# Patient Record
Sex: FEMALE | Race: WHITE | Employment: FULL TIME | URBAN - METROPOLITAN AREA
[De-identification: names, ages, dates, MRNs, and addresses within clinical notes are randomized per-mention and may not be internally consistent; named-entity substitution may affect disease eponyms.]

---

## 2017-11-05 ENCOUNTER — APPOINTMENT (EMERGENCY)
Dept: RADIOLOGY | Facility: HOSPITAL | Age: 27
End: 2017-11-05
Payer: COMMERCIAL

## 2017-11-05 ENCOUNTER — HOSPITAL ENCOUNTER (EMERGENCY)
Facility: HOSPITAL | Age: 27
Discharge: HOME | End: 2017-11-05
Attending: EMERGENCY MEDICINE | Admitting: EMERGENCY MEDICINE
Payer: COMMERCIAL

## 2017-11-05 VITALS
DIASTOLIC BLOOD PRESSURE: 80 MMHG | SYSTOLIC BLOOD PRESSURE: 135 MMHG | BODY MASS INDEX: 41.11 KG/M2 | OXYGEN SATURATION: 98 % | HEART RATE: 92 BPM | HEIGHT: 63 IN | RESPIRATION RATE: 18 BRPM | TEMPERATURE: 99.3 F | WEIGHT: 232 LBS

## 2017-11-05 DIAGNOSIS — K59.00 CONSTIPATION: Primary | ICD-10-CM

## 2017-11-05 LAB
CLARITY, POC: ABNORMAL
COLOR, POC: YELLOW
EXT BILIRUBIN, UA: NEGATIVE
EXT BLOOD URINE: ABNORMAL
EXT GLUCOSE, UA: NEGATIVE
EXT KETONES: NEGATIVE
EXT NITRITE, UA: NEGATIVE
EXT PH, UA: 6.5
EXT PREG TEST URINE: NEGATIVE
EXT PROTEIN, UA: ABNORMAL
EXT SPECIFIC GRAVITY, UA: 1.02
EXT UROBILINOGEN: 1
WBC # BLD EST: ABNORMAL 10*3/UL

## 2017-11-05 PROCEDURE — 81025 URINE PREGNANCY TEST: CPT | Performed by: EMERGENCY MEDICINE

## 2017-11-05 PROCEDURE — 99283 EMERGENCY DEPT VISIT LOW MDM: CPT

## 2017-11-05 PROCEDURE — 81002 URINALYSIS NONAUTO W/O SCOPE: CPT | Performed by: EMERGENCY MEDICINE

## 2017-11-05 PROCEDURE — 74000 HB X-RAY EXAM OF ABDOMEN (SINGLE ANTEROPOSTERIOR VIEW): CPT

## 2017-11-05 RX ORDER — LACTULOSE 20 G/30ML
20 SOLUTION ORAL 2 TIMES DAILY
Qty: 473 ML | Refills: 0 | Status: SHIPPED | OUTPATIENT
Start: 2017-11-05 | End: 2018-10-16

## 2017-11-05 NOTE — DISCHARGE INSTRUCTIONS
Constipation   WHAT YOU NEED TO KNOW:   Constipation is when you have hard, dry bowel movements, or you go longer than usual between bowel movements  DISCHARGE INSTRUCTIONS:   Return to the emergency department if:   · You have blood in your bowel movements  · You have a fever and abdominal pain with the constipation  Contact your healthcare provider if:   · Your constipation gets worse  · You start to vomit  · You have questions or concerns about your condition or care  Medicines:   · Medicine or a fiber supplement  may help make your bowel movement softer  A laxative may help relax and loosen your intestines to help you have a bowel movement  You may also be given medicine to increase fluid in your intestines  The fluid may help move bowel movements through your intestines  · Take your medicine as directed  Contact your healthcare provider if you think your medicine is not helping or if you have side effects  Tell him of her if you are allergic to any medicine  Keep a list of the medicines, vitamins, and herbs you take  Include the amounts, and when and why you take them  Bring the list or the pill bottles to follow-up visits  Carry your medicine list with you in case of an emergency  Manage your constipation:   · Drink liquids as directed  You may need to drink extra liquids to help soften and move your bowels  Ask how much liquid to drink each day and which liquids are best for you  · Eat high-fiber foods  This may help decrease constipation by adding bulk to your bowel movements  High-fiber foods include fruit, vegetables, whole-grain breads and cereals, and beans  Your healthcare provider or dietitian can help you create a high-fiber meal plan  · Exercise regularly  Regular physical activity can help stimulate your intestines  Ask which exercises are best for you  · Schedule a time each day to have a bowel movement    This may help train your body to have regular bowel movements  Bend forward while you are on the toilet to help move the bowel movement out  Sit on the toilet for at least 10 minutes, even if you do not have a bowel movement  Follow up with your healthcare provider as directed:  Write down your questions so you remember to ask them during your visits  © 2017 [address] [employer] Information is for End User's use only and may not be sold, redistributed or otherwise used for commercial purposes  All illustrations and images included in CareNotes® are the copyrighted property of [employer]  or [employer] Phel[employer]  The above information is an  only  It is not intended as medical advice for individual conditions or treatments  Talk to your doctor, nurse or pharmacist before following any medical regimen to see if it is safe and effective for you

## 2017-11-10 NOTE — ED PROVIDER NOTES
History  Chief Complaint   Patient presents with    Constipation     Patient states"she has constipation for 4 days"  Denies nausea, vomitting, and diarrhea  Patient states"she has abdominal cramping"  History provided by:  Patient  Constipation   Severity:  Moderate  Time since last bowel movement:  1 week  Timing:  Constant  Progression:  Worsening  Chronicity:  New  Context: dehydration    Stool description:  None produced  Relieved by:  Nothing  Worsened by:  Nothing  Ineffective treatments:  None tried  Associated symptoms: abdominal pain (mild abd cramps on left)    Associated symptoms: no diarrhea, no dysuria, no fever, no nausea, no urinary retention and no vomiting    Risk factors: no change in medication, no recent antibiotic use, no recent surgery and no recent travel        None       History reviewed  No pertinent past medical history  Past Surgical History:   Procedure Laterality Date    CHOLECYSTECTOMY      WISDOM TOOTH EXTRACTION         History reviewed  No pertinent family history  I have reviewed and agree with the history as documented  Social History   Substance Use Topics    Smoking status: Never Smoker    Smokeless tobacco: Never Used    Alcohol use No        Review of Systems   Constitutional: Negative for fever  Gastrointestinal: Positive for abdominal pain (mild abd cramps on left) and constipation  Negative for diarrhea, nausea and vomiting  Genitourinary: Negative for dysuria  All other systems reviewed and are negative        Physical Exam  ED Triage Vitals [11/05/17 1419]   Temperature Pulse Respirations Blood Pressure SpO2   99 3 °F (37 4 °C) 95 20 144/81 100 %      Temp Source Heart Rate Source Patient Position - Orthostatic VS BP Location FiO2 (%)   Oral Monitor Sitting Right arm --      Pain Score       --           Orthostatic Vital Signs  Vitals:    11/05/17 1419 11/05/17 1646   BP: 144/81 135/80   Pulse: 95 92   Patient Position - Orthostatic VS: Sitting Lying       Physical Exam   Constitutional: She is oriented to person, place, and time  She appears well-developed and well-nourished  No distress  HENT:   Head: Normocephalic and atraumatic  Eyes: Pupils are equal, round, and reactive to light  Neck: Neck supple  Cardiovascular: Normal rate and regular rhythm  Pulmonary/Chest: Effort normal and breath sounds normal  No respiratory distress  Abdominal: Soft  Bowel sounds are normal  She exhibits no distension  There is no tenderness  Musculoskeletal: Normal range of motion  Neurological: She is alert and oriented to person, place, and time  Skin: Skin is warm and dry  She is not diaphoretic  Psychiatric: She has a normal mood and affect  Nursing note and vitals reviewed        ED Medications  Medications - No data to display    Diagnostic Studies  Results Reviewed     Procedure Component Value Units Date/Time    POCT pregnancy, urine [[id number]]  (Normal) Resulted:  11/05/17 1613    Lab Status:  Final result Updated:  11/05/17 1613     EXT PREG TEST UR (Ref: Negative) Negative    POCT urinalysis dipstick [[id number]]  (Abnormal) Resulted:  11/05/17 1611    Lab Status:  Final result Specimen:  Urine Updated:  11/05/17 1613     Color, UA Yellow     Clarity, UA Hazy     EXT Glucose, UA Negative     EXT Bilirubin, UA (Ref: Negative) Negative     EXT Ketones, UA (Ref: Negative) Negative     EXT Spec Grav, UA 1 025     EXT Blood, UA (Ref: Negative) Hemolyzed trace     EXT pH, UA 6 5     EXT Protein, UA (Ref: Negative) Trace     EXT Urobilinogen, UA (Ref: 0 2- 1 0) 1     EXT Leukocytes, UA (Ref: Negative) Moderate     EXT Nitrite, UA (Ref: Negative) Negative                 XR abdomen 1 view kub   ED Interpretation by Anastasia Pizarro MD (11/05 1637)   NAD      Final Result by Lauren Tineo MD (11/06 0829)   Moderate amount of fecal debris seen within the colon   Nonobstructive bowel gas pattern   Findings concur with the preliminary report by the referring clinician already in PACS and/or our electronic record EPIC  Workstation performed: [id number]                    Procedures  Procedures       Phone Contacts  ED Phone Contact    ED Course  ED Course                                MDM  Number of Diagnoses or Management Options  Constipation: new and requires workup     Amount and/or Complexity of Data Reviewed  Tests in the radiology section of CPT®: ordered and reviewed  Independent visualization of images, tracings, or specimens: yes      CritCare Time    Disposition  Final diagnoses:   Constipation     Time reflects when diagnosis was documented in both MDM as applicable and the Disposition within this note     Time User Action Codes Description Comment    11/5/2017  4:37 PM Kylee GAYTAN Add [K59 00] Constipation       ED Disposition     ED Disposition Condition Comment    Discharge  Donivan Leavens discharge to home/self care  Condition at discharge: Good        Follow-up Information     Follow up With Specialties Details Why Contact Info Additional Information    [facility] Emergency Department Emergency Medicine   [address] [facility] [address]  [phone] [address] [place] [address], [address] [place] [address], [facility]        Discharge Medication List as of 11/5/2017  4:41 PM      START taking these medications    Details   bisacodyl (FLEET) 10 MG/30ML ENEM Insert 30 mL into the rectum once for 1 dose, Starting Sun 11/5/2017, Print      lactulose 20 g/30 mL Take 30 mL by mouth 2 (two) times a day, Starting Sun 11/5/2017, Print           No discharge procedures on file      ED Provider  Electronically Signed by           Yvonne Berger MD  11/15/17 2783

## 2018-10-08 ENCOUNTER — TELEPHONE (OUTPATIENT)
Dept: INTERNAL MEDICINE CLINIC | Facility: CLINIC | Age: 28
End: 2018-10-08

## 2018-10-08 NOTE — TELEPHONE ENCOUNTER
Answering service 10/7/2018 8:51 pm   When going to the bathroom she is bleeding not menstrual cycle  Wants to speak with on-call as to what to do

## 2018-10-08 NOTE — TELEPHONE ENCOUNTER
I spoke to her on the phone sounds like bright red rectal bleeding occasionally in the last few days she can go to the emergency room or be seen here as an outpatient on the following she was reassured

## 2018-10-16 ENCOUNTER — LAB (OUTPATIENT)
Dept: LAB | Facility: CLINIC | Age: 28
End: 2018-10-16
Payer: COMMERCIAL

## 2018-10-16 ENCOUNTER — OFFICE VISIT (OUTPATIENT)
Dept: INTERNAL MEDICINE CLINIC | Facility: CLINIC | Age: 28
End: 2018-10-16
Payer: COMMERCIAL

## 2018-10-16 ENCOUNTER — TRANSCRIBE ORDERS (OUTPATIENT)
Dept: LAB | Facility: CLINIC | Age: 28
End: 2018-10-16

## 2018-10-16 VITALS
SYSTOLIC BLOOD PRESSURE: 114 MMHG | OXYGEN SATURATION: 98 % | DIASTOLIC BLOOD PRESSURE: 80 MMHG | WEIGHT: 250.8 LBS | BODY MASS INDEX: 44.44 KG/M2 | HEART RATE: 88 BPM | HEIGHT: 63 IN

## 2018-10-16 DIAGNOSIS — K64.8 OTHER HEMORRHOIDS: Primary | ICD-10-CM

## 2018-10-16 DIAGNOSIS — K90.41 GLUTEN INTOLERANCE: ICD-10-CM

## 2018-10-16 DIAGNOSIS — K64.8 OTHER HEMORRHOIDS: ICD-10-CM

## 2018-10-16 DIAGNOSIS — R63.5 ABNORMAL WEIGHT GAIN: ICD-10-CM

## 2018-10-16 DIAGNOSIS — K59.00 CONSTIPATION, UNSPECIFIED CONSTIPATION TYPE: ICD-10-CM

## 2018-10-16 DIAGNOSIS — Z87.19 HISTORY OF RECTAL BLEEDING: ICD-10-CM

## 2018-10-16 DIAGNOSIS — Z13.6 SCREENING FOR CARDIOVASCULAR CONDITION: ICD-10-CM

## 2018-10-16 DIAGNOSIS — N92.6 IRREGULAR MENSTRUAL CYCLE: Primary | ICD-10-CM

## 2018-10-16 DIAGNOSIS — E66.01 MORBID OBESITY (HCC): ICD-10-CM

## 2018-10-16 DIAGNOSIS — N92.6 IRREGULAR MENSTRUAL CYCLE: ICD-10-CM

## 2018-10-16 PROBLEM — E66.3 OVERWEIGHT: Status: RESOLVED | Noted: 2018-10-09 | Resolved: 2018-10-16

## 2018-10-16 PROBLEM — E66.3 OVERWEIGHT: Status: ACTIVE | Noted: 2018-10-09

## 2018-10-16 LAB
ALBUMIN SERPL BCP-MCNC: 3.9 G/DL (ref 3.5–5)
ALP SERPL-CCNC: 91 U/L (ref 46–116)
ALT SERPL W P-5'-P-CCNC: 24 U/L (ref 12–78)
ANION GAP SERPL CALCULATED.3IONS-SCNC: 7 MMOL/L (ref 4–13)
AST SERPL W P-5'-P-CCNC: 14 U/L (ref 5–45)
BASOPHILS # BLD AUTO: 0.02 THOUSANDS/ΜL (ref 0–0.1)
BASOPHILS NFR BLD AUTO: 0 % (ref 0–1)
BILIRUB SERPL-MCNC: 0.34 MG/DL (ref 0.2–1)
BUN SERPL-MCNC: 12 MG/DL (ref 5–25)
CALCIUM SERPL-MCNC: 8.9 MG/DL (ref 8.3–10.1)
CHLORIDE SERPL-SCNC: 102 MMOL/L (ref 100–108)
CHOLEST SERPL-MCNC: 187 MG/DL (ref 50–200)
CO2 SERPL-SCNC: 27 MMOL/L (ref 21–32)
CREAT SERPL-MCNC: 0.78 MG/DL (ref 0.6–1.3)
EOSINOPHIL # BLD AUTO: 0.09 THOUSAND/ΜL (ref 0–0.61)
EOSINOPHIL NFR BLD AUTO: 1 % (ref 0–6)
ERYTHROCYTE [DISTWIDTH] IN BLOOD BY AUTOMATED COUNT: 13.2 % (ref 11.6–15.1)
GFR SERPL CREATININE-BSD FRML MDRD: 102 ML/MIN/1.73SQ M
GLUCOSE P FAST SERPL-MCNC: 82 MG/DL (ref 65–99)
HCT VFR BLD AUTO: 43.4 % (ref 34.8–46.1)
HDLC SERPL-MCNC: 41 MG/DL (ref 40–60)
HGB BLD-MCNC: 13.3 G/DL (ref 11.5–15.4)
IMM GRANULOCYTES # BLD AUTO: 0.03 THOUSAND/UL (ref 0–0.2)
IMM GRANULOCYTES NFR BLD AUTO: 0 % (ref 0–2)
LDLC SERPL CALC-MCNC: 119 MG/DL (ref 0–100)
LYMPHOCYTES # BLD AUTO: 2.51 THOUSANDS/ΜL (ref 0.6–4.47)
LYMPHOCYTES NFR BLD AUTO: 25 % (ref 14–44)
MCH RBC QN AUTO: 28.2 PG (ref 26.8–34.3)
MCHC RBC AUTO-ENTMCNC: 30.6 G/DL (ref 31.4–37.4)
MCV RBC AUTO: 92 FL (ref 82–98)
MONOCYTES # BLD AUTO: 0.55 THOUSAND/ΜL (ref 0.17–1.22)
MONOCYTES NFR BLD AUTO: 5 % (ref 4–12)
NEUTROPHILS # BLD AUTO: 6.98 THOUSANDS/ΜL (ref 1.85–7.62)
NEUTS SEG NFR BLD AUTO: 69 % (ref 43–75)
NONHDLC SERPL-MCNC: 146 MG/DL
NRBC BLD AUTO-RTO: 0 /100 WBCS
PLATELET # BLD AUTO: 319 THOUSANDS/UL (ref 149–390)
PMV BLD AUTO: 11.3 FL (ref 8.9–12.7)
POTASSIUM SERPL-SCNC: 3.7 MMOL/L (ref 3.5–5.3)
PROT SERPL-MCNC: 8.4 G/DL (ref 6.4–8.2)
RBC # BLD AUTO: 4.71 MILLION/UL (ref 3.81–5.12)
SODIUM SERPL-SCNC: 136 MMOL/L (ref 136–145)
TRIGL SERPL-MCNC: 134 MG/DL
TSH SERPL DL<=0.05 MIU/L-ACNC: 1.29 UIU/ML (ref 0.36–3.74)
WBC # BLD AUTO: 10.18 THOUSAND/UL (ref 4.31–10.16)

## 2018-10-16 PROCEDURE — 80053 COMPREHEN METABOLIC PANEL: CPT

## 2018-10-16 PROCEDURE — 82784 ASSAY IGA/IGD/IGG/IGM EACH: CPT

## 2018-10-16 PROCEDURE — 1036F TOBACCO NON-USER: CPT | Performed by: INTERNAL MEDICINE

## 2018-10-16 PROCEDURE — 83516 IMMUNOASSAY NONANTIBODY: CPT

## 2018-10-16 PROCEDURE — 80061 LIPID PANEL: CPT

## 2018-10-16 PROCEDURE — 85025 COMPLETE CBC W/AUTO DIFF WBC: CPT

## 2018-10-16 PROCEDURE — 3008F BODY MASS INDEX DOCD: CPT | Performed by: INTERNAL MEDICINE

## 2018-10-16 PROCEDURE — 99204 OFFICE O/P NEW MOD 45 MIN: CPT | Performed by: INTERNAL MEDICINE

## 2018-10-16 PROCEDURE — 86255 FLUORESCENT ANTIBODY SCREEN: CPT

## 2018-10-16 PROCEDURE — 36415 COLL VENOUS BLD VENIPUNCTURE: CPT

## 2018-10-16 PROCEDURE — 84443 ASSAY THYROID STIM HORMONE: CPT

## 2018-10-16 NOTE — PROGRESS NOTES
INTERNAL MEDICINE OFFICE VISIT  [facility], [address]  Tel: [phone]      NAME: Mat Strauss  AGE: 25 y o  SEX: female  : 1990   MRN: [id number]    DATE: 10/16/2018  TIME: 2:13 PM      Assessment and Plan:  1  Other hemorrhoids  She was told to see GI   She is stable right now  - Ambulatory referral to Gastroenterology; Future  - CBC and differential; Future  - Comprehensive metabolic panel; Future    2  History of rectal bleeding  About a 1-1/2 months ago she bled for more than a month and it was bright red blood per rectum  She thinks it is the hemorrhoids as she was diagnosed during her pregnancy  3  Gluten intolerance  She has noticed that whenever she eats wheat products, she gets constipated  She wants to be tested for gluten sensitivity  - Ambulatory referral to Gastroenterology; Future  - Celiac Disease Antibody Profile; Future    4  Morbid obesity ([address] )  She was encouraged to continue to watch her diet for calories and fats and to increase her activity in order to lose weight  5  Screening for cardiovascular condition    - Lipid panel; Future      - Counseling Documentation: patient was counseled regarding: instructions for management, risk factor reductions, prognosis, patient and family education, impressions, risks and benefits of treatment options and importance of compliance with treatment  - Medication Side Effects: Adverse side effects of medications were reviewed with the patient/guardian today  Return for follow up visit in as needed or earlier, if needed  Chief Complaint:  Chief Complaint   Patient presents with    Establish Care         History of Present Illness: This is a new patient was here to establish  Hemorrhoids-she was diagnosed when she was pregnant with internal and external hemorrhoids  She has been bleeding on and off with it    Rectal bleeding-for the last 1 and half months she has been bleeding every time she went to past stools  She did not seek medical attention  Gluten intolerance-she has noticed whenever she eats food with wheat products, she gets constipated  She has never been tested for it though  Morbid obesity-she has been trying to lose weight and is watching her diet and exercising in order to lose weight  Active Problem List:  Patient Active Problem List   Diagnosis    Other hemorrhoids    History of rectal bleeding    Gluten intolerance    Morbid obesity ([address] )         Past Medical History:  History reviewed  No pertinent past medical history  Past Surgical History:  Past Surgical History:   Procedure Laterality Date    CHOLECYSTECTOMY      WISDOM TOOTH EXTRACTION           Family History:  Family History   Problem Relation Age of Onset    Sickle cell trait Mother     Asthma Father          Social History:  Social History     Social History    Marital status: Single     Spouse name: N/A    Number of children: N/A    Years of education: N/A     Social History Main Topics    Smoking status: Never Smoker    Smokeless tobacco: Never Used    Alcohol use No    Drug use: No    Sexual activity: Not Asked     Other Topics Concern    None     Social History Narrative    None         Allergies:  No Known Allergies      Medications:  No current outpatient prescriptions on file        The following portions of the patient's history were reviewed and updated as appropriate: past medical history, past surgical history, family history, social history, allergies, current medications and active problem list       Review of Systems:  Constitutional: Denies fever, chills, weight gain, weight loss, fatigue  Eyes: Denies eye redness, eye discharge, double vision, change in visual acuity  ENT: Denies hearing loss, tinnitus, sneezing, nasal congestion, nasal discharge, sore throat   Respiratory: Denies cough, expectoration, hemoptysis, shortness of breath, wheezing  Cardiovascular: Denies chest pain, palpitations, lower extremity swelling, orthopnea, PND  Gastrointestinal: Denies abdominal pain, heartburn, nausea, vomiting, hematemesis, diarrhea, bloody stools  Genito-Urinary: Denies dysuria, frequency, difficulty in micturition, nocturia, incontinence  Musculoskeletal: Denies back pain, joint pain, muscle pain  Neurologic: Denies confusion, lightheadedness, syncope, headache, focal weakness, sensory changes, seizures  Endocrine: Denies polyuria, polydipsia, temperature intolerance  Allergy and Immunology: Denies hives, insect bite sensitivity  Hematological and Lymphatic: Denies bleeding problems, swollen glands   Psychological: Denies depression, suicidal ideation, anxiety, panic, mood swings  Dermatological: Denies pruritus, rash, skin lesion changes      Vitals:  Vitals:    10/16/18 1352   BP: 114/80   Pulse: 88   SpO2: 98%       Body mass index is 45 14 kg/m²  Weight (last 2 days)     Date/Time   Weight    10/16/18 1352  114 (250 8)                Physical Examination:  General: Patient is not in acute distress  Awake, alert, responding to commands  No weight gain or loss  Head: Normocephalic  Atraumatic  Eyes: Conjunctiva and lids with no swelling, erythema or discharge  Both pupils normal sized, round and reactive to light  Sclera nonicteric  ENT: External examination of nose and ear normal  Otoscopic examination shows translucent tympanic membranes with patent canals without erythema  Oropharynx moist with no erythema, edema, exudate or lesions  Neck: Supple  JVP not raised  Trachea midline  No masses  No thyromegaly  Lungs: No signs of increased work of breathing or respiratory distress  Bilateral bronchovascular breath sounds with no crackles or rhonchi  Chest wall: No tenderness  Cardiovascular: Normal PMI  No thrills  Regular rate and rhythm  S1 and S2 normal  No murmur, rub or gallop  Gastrointestinal: Abdomen soft, nontender  No guarding or rigidity  Liver and spleen not palpable  Bowel sounds present  Neurologic: Cranial nerves II-XII intact  Cortical functions normal  Motor system - Reflexes 2+ and symmetrical  Sensations normal  Musculoskeletal: Gait normal  No joint tenderness  Integumentary: Skin normal with no rash or lesions  Lymphatic: No palpable lymph nodes in neck, axilla or groin  Extremities: No clubbing, cyanosis, edema or varicosities  Psychological: Judgement and insight normal  Mood and affect normal      Laboratory Results:  CBC with diff:   Lab Results   Component Value Date    WBC 11 4 (H) 08/11/2014    RBC 4 11 08/11/2014    HGB 12 4 08/11/2014    HCT 36 4 08/11/2014    MCV 89 08/11/2014    MCH 30 1 08/11/2014    RDW 11 4 08/11/2014     08/11/2014       CMP:  Lab Results   Component Value Date    PROT 7 1 07/30/2015    ALKPHOS 301 (H) 07/30/2015     (H) 07/30/2015     (H) 07/30/2015    BILIDIR 5 20 (H) 07/30/2015       No results found for: HGBA1C, MG, PHOS    No results found for: TROPONINI, CKMB, CKTOTAL    Lipid Profile:   No results found for: CHOL  No results found for: HDL  No results found for: LDLCALC  No results found for: TRIG      Health Maintenance:  Health Maintenance   Topic Date Due    Depression Screening PHQ  1990    DTaP,Tdap,and Td Vaccines (1 - Tdap) 01/26/2009    PAP SMEAR  01/26/2009    INFLUENZA VACCINE  07/01/2018       There is no immunization history on file for this patient        Mar Payne MD  10/16/2018,2:13 PM

## 2018-10-17 LAB
ENDOMYSIUM IGA SER QL: NEGATIVE
GLIADIN PEPTIDE IGA SER-ACNC: 23 UNITS (ref 0–19)
GLIADIN PEPTIDE IGG SER-ACNC: 4 UNITS (ref 0–19)
IGA SERPL-MCNC: 374 MG/DL (ref 87–352)
TTG IGA SER-ACNC: <2 U/ML (ref 0–3)
TTG IGG SER-ACNC: <2 U/ML (ref 0–5)

## 2018-10-18 ENCOUNTER — TELEPHONE (OUTPATIENT)
Dept: INTERNAL MEDICINE CLINIC | Facility: CLINIC | Age: 28
End: 2018-10-18

## 2018-10-18 NOTE — TELEPHONE ENCOUNTER
----- Message from Jessi Rush MD sent at 10/18/2018  5:42 PM EDT -----  One of the antibodies are mildly positive for gluten intolerance  So it is not showing severe disease but if avoiding gluten helps your symptoms, please do so

## 2019-10-19 ENCOUNTER — HOSPITAL ENCOUNTER (EMERGENCY)
Facility: HOSPITAL | Age: 29
Discharge: HOME | End: 2019-10-19
Attending: EMERGENCY MEDICINE | Admitting: EMERGENCY MEDICINE
Payer: COMMERCIAL

## 2019-10-19 VITALS
BODY MASS INDEX: 42.84 KG/M2 | HEIGHT: 62 IN | WEIGHT: 232.81 LBS | DIASTOLIC BLOOD PRESSURE: 67 MMHG | OXYGEN SATURATION: 98 % | TEMPERATURE: 98.2 F | HEART RATE: 71 BPM | SYSTOLIC BLOOD PRESSURE: 123 MMHG | RESPIRATION RATE: 16 BRPM

## 2019-10-19 DIAGNOSIS — K62.5 RECTAL BLEEDING: ICD-10-CM

## 2019-10-19 DIAGNOSIS — R42 DIZZINESS: Primary | ICD-10-CM

## 2019-10-19 DIAGNOSIS — K59.00 CONSTIPATION: ICD-10-CM

## 2019-10-19 LAB
APTT PPP: 32 SECONDS (ref 23–37)
ATRIAL RATE: 73 BPM
B-HCG SERPL-ACNC: <2 MIU/ML
BASOPHILS # BLD AUTO: 0.01 THOUSANDS/ΜL (ref 0–0.1)
BASOPHILS NFR BLD AUTO: 0 % (ref 0–1)
EOSINOPHIL # BLD AUTO: 0.12 THOUSAND/ΜL (ref 0–0.61)
EOSINOPHIL NFR BLD AUTO: 1 % (ref 0–6)
ERYTHROCYTE [DISTWIDTH] IN BLOOD BY AUTOMATED COUNT: 13.5 % (ref 11.6–15.1)
HCT VFR BLD AUTO: 40.7 % (ref 34.8–46.1)
HGB BLD-MCNC: 13 G/DL (ref 11.5–15.4)
IMM GRANULOCYTES # BLD AUTO: 0.04 THOUSAND/UL (ref 0–0.2)
IMM GRANULOCYTES NFR BLD AUTO: 0 % (ref 0–2)
INR PPP: 1.03 (ref 0.84–1.19)
LYMPHOCYTES # BLD AUTO: 1.85 THOUSANDS/ΜL (ref 0.6–4.47)
LYMPHOCYTES NFR BLD AUTO: 14 % (ref 14–44)
MCH RBC QN AUTO: 29 PG (ref 26.8–34.3)
MCHC RBC AUTO-ENTMCNC: 31.9 G/DL (ref 31.4–37.4)
MCV RBC AUTO: 91 FL (ref 82–98)
MONOCYTES # BLD AUTO: 0.7 THOUSAND/ΜL (ref 0.17–1.22)
MONOCYTES NFR BLD AUTO: 5 % (ref 4–12)
NEUTROPHILS # BLD AUTO: 10.38 THOUSANDS/ΜL (ref 1.85–7.62)
NEUTS SEG NFR BLD AUTO: 80 % (ref 43–75)
NRBC BLD AUTO-RTO: 0 /100 WBCS
P AXIS: 39 DEGREES
PLATELET # BLD AUTO: 257 THOUSANDS/UL (ref 149–390)
PMV BLD AUTO: 10.9 FL (ref 8.9–12.7)
PR INTERVAL: 130 MS
PROTHROMBIN TIME: 13.5 SECONDS (ref 11.6–14.5)
QRS AXIS: 55 DEGREES
QRSD INTERVAL: 82 MS
QT INTERVAL: 400 MS
QTC INTERVAL: 452 MS
RBC # BLD AUTO: 4.49 MILLION/UL (ref 3.81–5.12)
T WAVE AXIS: 49 DEGREES
VENTRICULAR RATE: 77 BPM
WBC # BLD AUTO: 13.1 THOUSAND/UL (ref 4.31–10.16)

## 2019-10-19 PROCEDURE — 84702 CHORIONIC GONADOTROPIN TEST: CPT | Performed by: EMERGENCY MEDICINE

## 2019-10-19 PROCEDURE — 99284 EMERGENCY DEPT VISIT MOD MDM: CPT

## 2019-10-19 PROCEDURE — 99285 EMERGENCY DEPT VISIT HI MDM: CPT | Performed by: EMERGENCY MEDICINE

## 2019-10-19 PROCEDURE — 85610 PROTHROMBIN TIME: CPT | Performed by: EMERGENCY MEDICINE

## 2019-10-19 PROCEDURE — 85025 COMPLETE CBC W/AUTO DIFF WBC: CPT | Performed by: EMERGENCY MEDICINE

## 2019-10-19 PROCEDURE — 85730 THROMBOPLASTIN TIME PARTIAL: CPT | Performed by: EMERGENCY MEDICINE

## 2019-10-19 PROCEDURE — 93010 ELECTROCARDIOGRAM REPORT: CPT | Performed by: INTERNAL MEDICINE

## 2019-10-19 PROCEDURE — 36415 COLL VENOUS BLD VENIPUNCTURE: CPT | Performed by: EMERGENCY MEDICINE

## 2019-10-19 PROCEDURE — 93005 ELECTROCARDIOGRAM TRACING: CPT

## 2019-10-19 RX ORDER — DOCUSATE SODIUM 100 MG/1
100 CAPSULE, LIQUID FILLED ORAL EVERY 12 HOURS
Qty: 60 CAPSULE | Refills: 3 | Status: SHIPPED | OUTPATIENT
Start: 2019-10-19 | End: 2019-11-12

## 2019-10-19 RX ORDER — POLYETHYLENE GLYCOL 3350 17 G/17G
17 POWDER, FOR SOLUTION ORAL DAILY
Qty: 14 EACH | Refills: 3 | Status: SHIPPED | OUTPATIENT
Start: 2019-10-19 | End: 2019-11-12

## 2019-10-19 NOTE — ED NOTES
Dr Bertha Alvarez made aware that patient is still unable to provide urine sample   Patient given water      Martinez Garcia RN  10/19/19 8252

## 2019-10-19 NOTE — ED PROVIDER NOTES
History  Chief Complaint   Patient presents with    Constipation     LBM was 10/12  pt has had this problem in the past      Dizziness     dizziness since this morning  Pt comes to ED c/o recurrent constipation, previously treated here for same  Duration several days  Severity mild to moderate  Associated w some rectal bleeding when straining  No associated abd pain, weakness, fever, or significant PMH  No prior surgical abdominal history  Not pregnant  Also was dizzy this morning when she woke up but this has since resolved  None       History reviewed  No pertinent past medical history  Past Surgical History:   Procedure Laterality Date    CHOLECYSTECTOMY      WISDOM TOOTH EXTRACTION         Family History   Problem Relation Age of Onset    Sickle cell trait Mother     Asthma Father      I have reviewed and agree with the history as documented  Social History     Tobacco Use    Smoking status: Never Smoker    Smokeless tobacco: Never Used   Substance Use Topics    Alcohol use: No    Drug use: No        Review of Systems   Gastrointestinal: Positive for blood in stool and constipation  Negative for abdominal distention, abdominal pain, anal bleeding, diarrhea, nausea, rectal pain and vomiting  All other systems reviewed and are negative  Physical Exam  Physical Exam   Constitutional: She is oriented to person, place, and time  She appears well-developed and well-nourished  No distress  HENT:   Head: Normocephalic and atraumatic  Eyes: Pupils are equal, round, and reactive to light  Conjunctivae and EOM are normal  Right eye exhibits no discharge  Left eye exhibits no discharge  Neck: Normal range of motion  Neck supple  No JVD present  Pulmonary/Chest: No stridor  Abdominal: She exhibits no distension and no mass  There is no tenderness  There is no rebound and no guarding  Musculoskeletal: Normal range of motion  She exhibits no edema, tenderness or deformity  Neurological: She is alert and oriented to person, place, and time  No cranial nerve deficit or sensory deficit  She exhibits normal muscle tone  Coordination normal    Skin: Skin is warm and dry  Capillary refill takes less than 2 seconds  She is not diaphoretic  Nursing note and vitals reviewed        Vital Signs  ED Triage Vitals [10/19/19 1013]   Temperature Pulse Respirations Blood Pressure SpO2   98 2 °F (36 8 °C) 66 18 131/69 96 %      Temp Source Heart Rate Source Patient Position - Orthostatic VS BP Location FiO2 (%)   Oral Monitor Lying Right arm --      Pain Score       6           Vitals:    10/19/19 1013 10/19/19 1231   BP: 131/69 123/67   Pulse: 66 71   Patient Position - Orthostatic VS: Lying Lying         Visual Acuity      ED Medications  Medications - No data to display    Diagnostic Studies  Results Reviewed     Procedure Component Value Units Date/Time    Quantitative hCG [[id number]]  (Normal) Collected:  10/19/19 1248    Lab Status:  Final result Specimen:  Blood from Arm, Left Updated:  10/19/19 1320     HCG, Quant <2 mIU/mL     Narrative:        Expected Ranges:     Approximate               Approximate HCG  Gestation age          Concentration ( mIU/mL)  _____________          ______________________   Carlos Jackson                      HCG values  0 2-1                       5-50  1-2                           2-3                         100-5000  3-4                         500-56037  4-5                         1000-85466  5-6                         [id number]  6-8                         [id number]  8-12                        [id number]      Protime-INR [[id number]]  (Normal) Collected:  10/19/19 1247    Lab Status:  Final result Specimen:  Blood from Arm, Left Updated:  10/19/19 1304     Protime 13 5 seconds      INR 1 03    APTT [[id number]]  (Normal) Collected:  10/19/19 1247    Lab Status:  Final result Specimen:  Blood from Arm, Left Updated:  10/19/19 1304     PTT 32 seconds CBC and differential [[id number]]  (Abnormal) Collected:  10/19/19 1247    Lab Status:  Final result Specimen:  Blood from Arm, Left Updated:  10/19/19 1253     WBC 13 10 Thousand/uL      RBC 4 49 Million/uL      Hemoglobin 13 0 g/dL      Hematocrit 40 7 %      MCV 91 fL      MCH 29 0 pg      MCHC 31 9 g/dL      RDW 13 5 %      MPV 10 9 fL      Platelets 119 Thousands/uL      nRBC 0 /100 WBCs      Neutrophils Relative 80 %      Immat GRANS % 0 %      Lymphocytes Relative 14 %      Monocytes Relative 5 %      Eosinophils Relative 1 %      Basophils Relative 0 %      Neutrophils Absolute 10 38 Thousands/µL      Immature Grans Absolute 0 04 Thousand/uL      Lymphocytes Absolute 1 85 Thousands/µL      Monocytes Absolute 0 70 Thousand/µL      Eosinophils Absolute 0 12 Thousand/µL      Basophils Absolute 0 01 Thousands/µL     POCT pregnancy, urine [[id number]]     Lab Status:  No result                  No orders to display              Procedures  ECG 12 Lead Documentation Only  Date/Time: 10/19/2019 11:12 AM  Performed by: Ian Huang MD  Authorized by: Ian Huang MD     Indications / Diagnosis:  Dizziness  ECG reviewed by me, the ED Provider: yes    Patient location:  ED  Previous ECG:     Previous ECG:  Unavailable  Interpretation:     Interpretation: normal    Rate:     ECG rate:  77  Rhythm:     Rhythm: sinus rhythm    Comments:      SR, frequent PVCs  No comparison available  Impression - unremarkable ekg              ED Course                               MDM    Disposition  Final diagnoses:   Dizziness   Rectal bleeding   Constipation     Time reflects when diagnosis was documented in both MDM as applicable and the Disposition within this note     Time User Action Codes Description Comment    10/19/2019  1:39 PM Gee Rickers Add [R42] Dizziness     10/19/2019  1:39 PM Gee Rickers Add [K62 5] Rectal bleeding     10/19/2019  1:39 PM Gee Rickers Add [K59 00] Constipation       ED Disposition     ED Disposition Condition Date/Time Comment    Discharge Stable Sat Oct 19, 2019  1:39 PM Aakash Perez discharge to home/self care  Follow-up Information     Follow up With Specialties Details Why Contact Info    Hugo Ross MD Gastroenterology In 1 week  [address] [place] [facility] [address]  [phone]            Discharge Medication List as of 10/19/2019  1:40 PM      START taking these medications    Details   docusate sodium (COLACE) 100 mg capsule Take 1 capsule (100 mg total) by mouth every 12 (twelve) hours, Starting Sat 10/19/2019, Print      polyethylene glycol (MIRALAX) 17 g packet Take 17 g by mouth daily, Starting Sat 10/19/2019, Print           No discharge procedures on file      ED Provider  Electronically Signed by           Gisell Flores MD  10/19/19 5968

## 2019-10-30 ENCOUNTER — APPOINTMENT (OUTPATIENT)
Dept: LAB | Facility: CLINIC | Age: 29
End: 2019-10-30
Payer: COMMERCIAL

## 2019-10-30 ENCOUNTER — OFFICE VISIT (OUTPATIENT)
Dept: GASTROENTEROLOGY | Facility: CLINIC | Age: 29
End: 2019-10-30
Payer: COMMERCIAL

## 2019-10-30 ENCOUNTER — PREP FOR PROCEDURE (OUTPATIENT)
Dept: GASTROENTEROLOGY | Facility: CLINIC | Age: 29
End: 2019-10-30

## 2019-10-30 VITALS
WEIGHT: 227.4 LBS | HEART RATE: 78 BPM | HEIGHT: 62 IN | SYSTOLIC BLOOD PRESSURE: 118 MMHG | DIASTOLIC BLOOD PRESSURE: 78 MMHG | BODY MASS INDEX: 41.85 KG/M2

## 2019-10-30 DIAGNOSIS — K90.41 GLUTEN INTOLERANCE: ICD-10-CM

## 2019-10-30 DIAGNOSIS — K90.41 GLUTEN INTOLERANCE: Primary | ICD-10-CM

## 2019-10-30 DIAGNOSIS — K90.41 GLUTEN ENTEROPATHY: ICD-10-CM

## 2019-10-30 DIAGNOSIS — K59.00 CONSTIPATION, UNSPECIFIED CONSTIPATION TYPE: Primary | ICD-10-CM

## 2019-10-30 DIAGNOSIS — K59.00 CONSTIPATION, UNSPECIFIED CONSTIPATION TYPE: ICD-10-CM

## 2019-10-30 PROCEDURE — 81377 HLA II TYPE 1 AG EQUIV LR: CPT

## 2019-10-30 PROCEDURE — 36415 COLL VENOUS BLD VENIPUNCTURE: CPT

## 2019-10-30 PROCEDURE — 99203 OFFICE O/P NEW LOW 30 MIN: CPT | Performed by: PHYSICIAN ASSISTANT

## 2019-10-30 PROCEDURE — 81383 HLA II TYPING 1 ALLELE HR: CPT

## 2019-10-30 NOTE — PROGRESS NOTES
[facility] - Outpatient Consultation  Larisa Lockett 30 y o  female MRN: [id number]  Encounter: [id number]          ASSESSMENT AND PLAN:      1  Gluten intolerance  2  Constipation, unspecified constipation type  Will do EGD with biopsy for celiac  Will start Linzess 72mcg  Will do genetic testing for celiac    Follow up in 6 weeks  ______________________________________________________________________    HPI:    68-year-old female who is here for follow-up of gluten sensitivity as well as constipation problems  Patient reports that last year she was diagnosed with gluten sensitivity  She reports that when she did avoid gluten in her diet she did feel significantly better with her abdominal pain and change in bowel habits  She reports that most recently she has been more constipated is not responding to Colace or MiraLax  She does report occasional bleeding with her bowel movements  She does report that she knows when she does eat something with gluten hidden it she will have abdominal cramping bloating and gas  She denies any family history of any inflammatory bowel disease or celiac disease  She has never had the genetic testing for celiac  She denies any weight loss  She denies any nausea or vomiting  She has never seen a gastroenterologist   She has never had EGD or colonoscopy  REVIEW OF SYSTEMS:    CONSTITUTIONAL: Denies any fever, chills, rigors, and weight loss  HEENT: No earache or tinnitus  Denies hearing loss or visual disturbances  CARDIOVASCULAR: No chest pain or palpitations  RESPIRATORY: Denies any cough, hemoptysis, shortness of breath or dyspnea on exertion  GASTROINTESTINAL: As noted in the History of Present Illness  GENITOURINARY: No problems with urination  Denies any hematuria or dysuria  NEUROLOGIC: No dizziness or vertigo, denies headaches  MUSCULOSKELETAL: Denies any muscle or joint pain  SKIN: Denies skin rashes or itching  ENDOCRINE: Denies excessive thirst  Denies intolerance to heat or cold  PSYCHOSOCIAL: Denies depression or anxiety  Denies any recent memory loss  Historical Information   Past Medical History:   Diagnosis Date    Gluten intolerance      Past Surgical History:   Procedure Laterality Date    CHOLECYSTECTOMY      WISDOM TOOTH EXTRACTION       Social History   Social History     Substance and Sexual Activity   Alcohol Use No     Social History     Substance and Sexual Activity   Drug Use No     Social History     Tobacco Use   Smoking Status Never Smoker   Smokeless Tobacco Never Used     Family History   Problem Relation Age of Onset    Sickle cell trait Mother     Asthma Father        Meds/Allergies       Current Outpatient Medications:     docusate sodium (COLACE) 100 mg capsule    polyethylene glycol (MIRALAX) 17 g packet    Allergies   Allergen Reactions    Gluten Meal            Objective     Blood pressure 118/78, pulse 78, height 5' 2" (1 575 m), weight 103 kg (227 lb 6 4 oz)  Body mass index is 41 59 kg/m²  PHYSICAL EXAM:      General Appearance:   Alert, cooperative, no distress   HEENT:   Normocephalic, atraumatic, anicteric      Neck:  Supple, symmetrical, trachea midline   Lungs:   Clear to auscultation bilaterally; no rales, rhonchi or wheezing; respirations unlabored    Heart[de-identified]   Regular rate and rhythm; no murmur, rub, or gallop  Abdomen:   Soft, non-tender, non-distended; normal bowel sounds; no masses, no organomegaly    Genitalia:   Deferred    Rectal:   Deferred    Extremities:  No cyanosis, clubbing or edema    Pulses:  2+ and symmetric    Skin:  No jaundice, rashes, or lesions    Lymph nodes:  No palpable cervical lymphadenopathy        Lab Results:   No visits with results within 1 Day(s) from this visit     Latest known visit with results is:   Admission on 10/19/2019, Discharged on 10/19/2019   Component Date Value    Ventricular Rate 10/19/2019 77     Atrial Rate 10/19/2019 73     [place] Interval 10/19/2019 130     QRSD Interval 10/19/2019 82     QT Interval 10/19/2019 400     QTC Interval 10/19/2019 452     P Axis 10/19/2019 39     QRS Axis 10/19/2019 55     T Wave Axis 10/19/2019 49     HCG, Quant 10/19/2019 <2     Protime 10/19/2019 13 5     INR 10/19/2019 1 03     PTT 10/19/2019 32     WBC 10/19/2019 13 10*    RBC 10/19/2019 4 49     Hemoglobin 10/19/2019 13 0     Hematocrit 10/19/2019 40 7     MCV 10/19/2019 91     MCH 10/19/2019 29 0     MCHC 10/19/2019 31 9     RDW 10/19/2019 13 5     MPV 10/19/2019 10 9     Platelets 78/79/5981 257     nRBC 10/19/2019 0     Neutrophils Relative 10/19/2019 80*    Immat GRANS % 10/19/2019 0     Lymphocytes Relative 10/19/2019 14     Monocytes Relative 10/19/2019 5     Eosinophils Relative 10/19/2019 1     Basophils Relative 10/19/2019 0     Neutrophils Absolute 10/19/2019 10 38*    Immature Grans Absolute 10/19/2019 0 04     Lymphocytes Absolute 10/19/2019 1 85     Monocytes Absolute 10/19/2019 0 70     Eosinophils Absolute 10/19/2019 0 12     Basophils Absolute 10/19/2019 0 01          Radiology Results:   No results found

## 2019-10-30 NOTE — LETTER
October 30, 2019     Ml Tirado MD  [address] [place] [address]    Patient: Zach Jerry   YOB: 1990   Date of Visit: 10/30/2019       Dear Dr Caicedo Mask:    Thank you for referring Zach Jerry to me for evaluation  Below are my notes for this consultation  If you have questions, please do not hesitate to call me  I look forward to following your patient along with you  Sincerely,        Joella Canavan, PA-C        CC: No Recipients  Joella Canavan, PA-C  10/30/2019  8:35 AM  Sign at close encounter  [address] [facility] - Outpatient Consultation  Zach Jerry 30 y o  female MRN: [id number]  Encounter: [id number]          ASSESSMENT AND PLAN:      1  Gluten intolerance  2  Constipation, unspecified constipation type  Will do EGD with biopsy for celiac  Will start Linzess 72mcg  Will do genetic testing for celiac    Follow up in 6 weeks  ______________________________________________________________________    HPI:    68-year-old female who is here for follow-up of gluten sensitivity as well as constipation problems  Patient reports that last year she was diagnosed with gluten sensitivity  She reports that when she did avoid gluten in her diet she did feel significantly better with her abdominal pain and change in bowel habits  She reports that most recently she has been more constipated is not responding to Colace or MiraLax  She does report occasional bleeding with her bowel movements  She does report that she knows when she does eat something with gluten hidden it she will have abdominal cramping bloating and gas  She denies any family history of any inflammatory bowel disease or celiac disease  She has never had the genetic testing for celiac  She denies any weight loss  She denies any nausea or vomiting  She has never seen a gastroenterologist   She has never had EGD or colonoscopy      REVIEW OF SYSTEMS:    CONSTITUTIONAL: Denies any fever, chills, rigors, and weight loss  HEENT: No earache or tinnitus  Denies hearing loss or visual disturbances  CARDIOVASCULAR: No chest pain or palpitations  RESPIRATORY: Denies any cough, hemoptysis, shortness of breath or dyspnea on exertion  GASTROINTESTINAL: As noted in the History of Present Illness  GENITOURINARY: No problems with urination  Denies any hematuria or dysuria  NEUROLOGIC: No dizziness or vertigo, denies headaches  MUSCULOSKELETAL: Denies any muscle or joint pain  SKIN: Denies skin rashes or itching  ENDOCRINE: Denies excessive thirst  Denies intolerance to heat or cold  PSYCHOSOCIAL: Denies depression or anxiety  Denies any recent memory loss  Historical Information   Past Medical History:   Diagnosis Date    Gluten intolerance      Past Surgical History:   Procedure Laterality Date    CHOLECYSTECTOMY      WISDOM TOOTH EXTRACTION       Social History   Social History     Substance and Sexual Activity   Alcohol Use No     Social History     Substance and Sexual Activity   Drug Use No     Social History     Tobacco Use   Smoking Status Never Smoker   Smokeless Tobacco Never Used     Family History   Problem Relation Age of Onset    Sickle cell trait Mother     Asthma Father        Meds/Allergies       Current Outpatient Medications:     docusate sodium (COLACE) 100 mg capsule    polyethylene glycol (MIRALAX) 17 g packet    Allergies   Allergen Reactions    Gluten Meal            Objective     Blood pressure 118/78, pulse 78, height 5' 2" (1 575 m), weight 103 kg (227 lb 6 4 oz)  Body mass index is 41 59 kg/m²  PHYSICAL EXAM:      General Appearance:   Alert, cooperative, no distress   HEENT:   Normocephalic, atraumatic, anicteric      Neck:  Supple, symmetrical, trachea midline   Lungs:   Clear to auscultation bilaterally; no rales, rhonchi or wheezing; respirations unlabored    Heart[de-identified]   Regular rate and rhythm; no murmur, rub, or gallop  Abdomen:   Soft, non-tender, non-distended; normal bowel sounds; no masses, no organomegaly    Genitalia:   Deferred    Rectal:   Deferred    Extremities:  No cyanosis, clubbing or edema    Pulses:  2+ and symmetric    Skin:  No jaundice, rashes, or lesions    Lymph nodes:  No palpable cervical lymphadenopathy        Lab Results:   No visits with results within 1 Day(s) from this visit  Latest known visit with results is:   Admission on 10/19/2019, Discharged on 10/19/2019   Component Date Value    Ventricular Rate 10/19/2019 77     Atrial Rate 10/19/2019 73     [place] Interval 10/19/2019 130     QRSD Interval 10/19/2019 82     QT Interval 10/19/2019 400     QTC Interval 10/19/2019 452     P Axis 10/19/2019 39     QRS Axis 10/19/2019 55     T Wave Axis 10/19/2019 49     HCG, Quant 10/19/2019 <2     Protime 10/19/2019 13 5     INR 10/19/2019 1 03     PTT 10/19/2019 32     WBC 10/19/2019 13 10*    RBC 10/19/2019 4 49     Hemoglobin 10/19/2019 13 0     Hematocrit 10/19/2019 40 7     MCV 10/19/2019 91     MCH 10/19/2019 29 0     MCHC 10/19/2019 31 9     RDW 10/19/2019 13 5     MPV 10/19/2019 10 9     Platelets 63/14/4628 257     nRBC 10/19/2019 0     Neutrophils Relative 10/19/2019 80*    Immat GRANS % 10/19/2019 0     Lymphocytes Relative 10/19/2019 14     Monocytes Relative 10/19/2019 5     Eosinophils Relative 10/19/2019 1     Basophils Relative 10/19/2019 0     Neutrophils Absolute 10/19/2019 10 38*    Immature Grans Absolute 10/19/2019 0 04     Lymphocytes Absolute 10/19/2019 1 85     Monocytes Absolute 10/19/2019 0 70     Eosinophils Absolute 10/19/2019 0 12     Basophils Absolute 10/19/2019 0 01          Radiology Results:   No results found

## 2019-10-30 NOTE — PATIENT INSTRUCTIONS
Constipation   WHAT YOU NEED TO KNOW:   Constipation is when you have hard, dry bowel movements, or you go longer than usual between bowel movements  DISCHARGE INSTRUCTIONS:   Return to the emergency department if:   · You have blood in your bowel movements  · You have a fever and abdominal pain with the constipation  Contact your healthcare provider if:   · Your constipation gets worse  · You start to vomit  · You have questions or concerns about your condition or care  Medicines:   · Medicine or a fiber supplement  may help make your bowel movement softer  A laxative may help relax and loosen your intestines to help you have a bowel movement  You may also be given medicine to increase fluid in your intestines  The fluid may help move bowel movements through your intestines  · Take your medicine as directed  Contact your healthcare provider if you think your medicine is not helping or if you have side effects  Tell him of her if you are allergic to any medicine  Keep a list of the medicines, vitamins, and herbs you take  Include the amounts, and when and why you take them  Bring the list or the pill bottles to follow-up visits  Carry your medicine list with you in case of an emergency  Manage your constipation:   · Drink liquids as directed  You may need to drink extra liquids to help soften and move your bowels  Ask how much liquid to drink each day and which liquids are best for you  · Eat high-fiber foods  This may help decrease constipation by adding bulk to your bowel movements  High-fiber foods include fruit, vegetables, whole-grain breads and cereals, and beans  Your healthcare provider or dietitian can help you create a high-fiber meal plan  · Exercise regularly  Regular physical activity can help stimulate your intestines  Ask which exercises are best for you  · Schedule a time each day to have a bowel movement    This may help train your body to have regular bowel movements  Bend forward while you are on the toilet to help move the bowel movement out  Sit on the toilet for at least 10 minutes, even if you do not have a bowel movement  Follow up with your healthcare provider as directed:  Write down your questions so you remember to ask them during your visits  © 2017 [address] Information is for End User's use only and may not be sold, redistributed or otherwise used for commercial purposes  All illustrations and images included in CareNotes® are the copyrighted property of [employer]  or [employer]  The above information is an  only  It is not intended as medical advice for individual conditions or treatments  Talk to your doctor, nurse or pharmacist before following any medical regimen to see if it is safe and effective for you

## 2019-11-04 ENCOUNTER — TELEPHONE (OUTPATIENT)
Dept: GASTROENTEROLOGY | Facility: CLINIC | Age: 29
End: 2019-11-04

## 2019-11-04 NOTE — TELEPHONE ENCOUNTER
Turner Briscoe - Patient called she is still experiencing pain  Yesterday, 11/03/19 was a bad day  Had pain this morning, but has subsided   Is this normal  Please call Erika Solorzano at [phone]

## 2019-11-04 NOTE — TELEPHONE ENCOUNTER
GRAYSON: Spoke with patient history of gluten intolerance, constipation    Patient c/o mid abdominal pain  She is only having small pebble like BM every other day, and taking linzess 72mcg  Patient states she has not eaten in 2 days  She is drinking fluids  Advised patient to follow gluten free diet, encouraged plenty of fruits, vegetables, and lean meats  She is scheduled for an EGD 11/12, and her celiac genetic testing is pending  We will call her with the results  She will let us know if linzess is effective after 2 weeks

## 2019-11-12 ENCOUNTER — ANESTHESIA (OUTPATIENT)
Dept: GASTROENTEROLOGY | Facility: HOSPITAL | Age: 29
End: 2019-11-12

## 2019-11-12 ENCOUNTER — ANESTHESIA EVENT (OUTPATIENT)
Dept: GASTROENTEROLOGY | Facility: HOSPITAL | Age: 29
End: 2019-11-12

## 2019-11-12 ENCOUNTER — HOSPITAL ENCOUNTER (OUTPATIENT)
Dept: GASTROENTEROLOGY | Facility: HOSPITAL | Age: 29
Setting detail: OUTPATIENT SURGERY
Discharge: HOME | End: 2019-11-12
Payer: COMMERCIAL

## 2019-11-12 ENCOUNTER — TREATMENT (OUTPATIENT)
Dept: GASTROENTEROLOGY | Facility: CLINIC | Age: 29
End: 2019-11-12

## 2019-11-12 VITALS
OXYGEN SATURATION: 97 % | DIASTOLIC BLOOD PRESSURE: 71 MMHG | HEART RATE: 84 BPM | TEMPERATURE: 96.6 F | HEIGHT: 62 IN | RESPIRATION RATE: 18 BRPM | WEIGHT: 218.92 LBS | SYSTOLIC BLOOD PRESSURE: 123 MMHG | BODY MASS INDEX: 40.29 KG/M2

## 2019-11-12 DIAGNOSIS — K59.04 CHRONIC IDIOPATHIC CONSTIPATION: Primary | ICD-10-CM

## 2019-11-12 DIAGNOSIS — K90.41 GLUTEN ENTEROPATHY: ICD-10-CM

## 2019-11-12 DIAGNOSIS — K59.00 CONSTIPATION, UNSPECIFIED CONSTIPATION TYPE: ICD-10-CM

## 2019-11-12 LAB
EXT PREGNANCY TEST URINE: NEGATIVE
EXT. CONTROL: NORMAL

## 2019-11-12 PROCEDURE — 88305 TISSUE EXAM BY PATHOLOGIST: CPT | Performed by: PATHOLOGY

## 2019-11-12 PROCEDURE — 43239 EGD BIOPSY SINGLE/MULTIPLE: CPT | Performed by: INTERNAL MEDICINE

## 2019-11-12 PROCEDURE — 81025 URINE PREGNANCY TEST: CPT | Performed by: ANESTHESIOLOGY

## 2019-11-12 RX ORDER — DIPHENHYDRAMINE HYDROCHLORIDE 50 MG/ML
12.5 INJECTION INTRAMUSCULAR; INTRAVENOUS ONCE AS NEEDED
Status: DISCONTINUED | OUTPATIENT
Start: 2019-11-12 | End: 2019-11-16 | Stop reason: HOSPADM

## 2019-11-12 RX ORDER — ONDANSETRON 2 MG/ML
4 INJECTION INTRAMUSCULAR; INTRAVENOUS ONCE AS NEEDED
Status: DISCONTINUED | OUTPATIENT
Start: 2019-11-12 | End: 2019-11-16 | Stop reason: HOSPADM

## 2019-11-12 RX ORDER — METOCLOPRAMIDE HYDROCHLORIDE 5 MG/ML
10 INJECTION INTRAMUSCULAR; INTRAVENOUS ONCE AS NEEDED
Status: DISCONTINUED | OUTPATIENT
Start: 2019-11-12 | End: 2019-11-16 | Stop reason: HOSPADM

## 2019-11-12 RX ORDER — LIDOCAINE HYDROCHLORIDE 10 MG/ML
0.5 INJECTION, SOLUTION EPIDURAL; INFILTRATION; INTRACAUDAL; PERINEURAL ONCE AS NEEDED
Status: DISCONTINUED | OUTPATIENT
Start: 2019-11-12 | End: 2019-11-16 | Stop reason: HOSPADM

## 2019-11-12 RX ORDER — LIDOCAINE HYDROCHLORIDE 10 MG/ML
INJECTION, SOLUTION EPIDURAL; INFILTRATION; INTRACAUDAL; PERINEURAL AS NEEDED
Status: DISCONTINUED | OUTPATIENT
Start: 2019-11-12 | End: 2019-11-12 | Stop reason: SURG

## 2019-11-12 RX ORDER — PROPOFOL 10 MG/ML
INJECTION, EMULSION INTRAVENOUS AS NEEDED
Status: DISCONTINUED | OUTPATIENT
Start: 2019-11-12 | End: 2019-11-12 | Stop reason: SURG

## 2019-11-12 RX ORDER — SODIUM CHLORIDE, SODIUM LACTATE, POTASSIUM CHLORIDE, CALCIUM CHLORIDE 600; 310; 30; 20 MG/100ML; MG/100ML; MG/100ML; MG/100ML
125 INJECTION, SOLUTION INTRAVENOUS CONTINUOUS
Status: DISCONTINUED | OUTPATIENT
Start: 2019-11-12 | End: 2019-11-12

## 2019-11-12 RX ADMIN — LIDOCAINE HYDROCHLORIDE 50 MG: 10 INJECTION, SOLUTION EPIDURAL; INFILTRATION; INTRACAUDAL; PERINEURAL at 14:31

## 2019-11-12 RX ADMIN — SODIUM CHLORIDE, SODIUM LACTATE, POTASSIUM CHLORIDE, AND CALCIUM CHLORIDE 125 ML/HR: .6; .31; .03; .02 INJECTION, SOLUTION INTRAVENOUS at 14:06

## 2019-11-12 RX ADMIN — PROPOFOL 50 MG: 10 INJECTION, EMULSION INTRAVENOUS at 14:33

## 2019-11-12 RX ADMIN — PROPOFOL 50 MG: 10 INJECTION, EMULSION INTRAVENOUS at 14:32

## 2019-11-12 RX ADMIN — PROPOFOL 100 MG: 10 INJECTION, EMULSION INTRAVENOUS at 14:31

## 2019-11-12 NOTE — DISCHARGE INSTRUCTIONS
Upper Endoscopy   WHAT YOU NEED TO KNOW:   An upper endoscopy is also called an upper gastrointestinal (GI) endoscopy, or an esophagogastroduodenoscopy (EGD)  You may feel bloated, gassy, or have some abdominal discomfort after your procedure  Your throat may be sore for 24 to 36 hours  You may burp or pass gas from air that is still inside your body  DISCHARGE INSTRUCTIONS:   Call 911 for any of the following:   · You have sudden chest pain or trouble breathing  Seek care immediately if:   · You feel dizzy or faint  · You have trouble swallowing  · Your bowel movements are very dark or black  · Your abdomen is hard and firm and you have severe pain  · You vomit blood  Contact your healthcare provider if:   · You feel full or bloated and cannot burp or pass gas  · You have not had a bowel movement for 3 days after your procedure  · You have neck pain  · You have a fever or chills  · You have nausea or are vomiting  · You have a rash or hives  · You have questions or concerns about your endoscopy  Relieve a sore throat:  Suck on throat lozenges or crushed ice  Gargle with a small amount of warm salt water  Mix 1 teaspoon of salt and 1 cup of warm water to make salt water  Relieve gas and discomfort from bloating:  Lie on your right side with a heating pad on your abdomen  Take short walks to help pass gas  Eat small meals until bloating is relieved  Rest after your procedure: You have been given medicine to relax you  Do not  drive or make important decisions until the day after your procedure  Return to your normal activity as directed  You can usually return to work the day after your procedure  Follow up with your healthcare provider as directed:  Write down your questions so you remember to ask them during your visits     © 2017 [address] is for End User's use only and may not be sold, redistributed or otherwise used for commercial purposes  All illustrations and images included in CareNotes® are the copyrighted property of A [employer] Inc  or [employer]  The above information is an  only  It is not intended as medical advice for individual conditions or treatments  Talk to your doctor, nurse or pharmacist before following any medical regimen to see if it is safe and effective for you

## 2019-11-12 NOTE — H&P
All History and Physical - [facility]  Harpal Black 30 y o  female MRN: [id number]      HPI: Harpal Black is a 30y o  year old female who presents for evaluation of gluten intolerance, rule out celiac disease      REVIEW OF SYSTEMS: Per the HPI, and otherwise unremarkable  Historical Information   Past Medical History:   Diagnosis Date    Gluten intolerance      Past Surgical History:   Procedure Laterality Date    CHOLECYSTECTOMY      WISDOM TOOTH EXTRACTION       Social History   Social History     Substance and Sexual Activity   Alcohol Use No     Social History     Substance and Sexual Activity   Drug Use No     Social History     Tobacco Use   Smoking Status Never Smoker   Smokeless Tobacco Never Used     Family History   Problem Relation Age of Onset    Sickle cell trait Mother     Asthma Father        Meds/Allergies       (Not in a hospital admission)    Allergies   Allergen Reactions    Gluten Meal        Objective     There were no vitals taken for this visit  PHYSICAL EXAM    Gen: NAD  CV: RRR  CHEST: Clear  ABD: soft, NT/ND  EXT: no edema      ASSESSMENT/PLAN:  This is a 30y o  year old female here for esophagogastroduodenoscopy with biopsies of the duodenum, and she is stable and optimized for her procedure

## 2019-11-12 NOTE — ANESTHESIA POSTPROCEDURE EVALUATION
Post-Op Assessment Note    CV Status:  Stable  Pain Score: 0    Pain management: adequate     Mental Status:  Awake   Hydration Status:  Stable   PONV Controlled:  None   Airway Patency:  Patent and adequate   Post Op Vitals Reviewed: Yes      Staff: CRNA           BP   120/76   Temp     Pulse  93   Resp   20   SpO2   96

## 2019-11-12 NOTE — ANESTHESIA PREPROCEDURE EVALUATION
Review of Systems/Medical History  Patient summary reviewed  Chart reviewed  No history of anesthetic complications     Cardiovascular  Negative cardio ROS    Pulmonary  Negative pulmonary ROS        GI/Hepatic      Comment: Gluten intolerance     Negative  ROS        Endo/Other    Obesity  morbid obesity   GYN  Negative gynecology ROS          Hematology  Negative hematology ROS      Musculoskeletal  Negative musculoskeletal ROS        Neurology  Negative neurology ROS      Psychology   Negative psychology ROS              Physical Exam    Airway    Mallampati score: II  TM Distance: >3 FB  Neck ROM: full     Dental   No notable dental hx     Cardiovascular  Comment: Negative ROS, Rhythm: regular, Rate: normal, Cardiovascular exam normal    Pulmonary  Pulmonary exam normal Breath sounds clear to auscultation,     Other Findings        Anesthesia Plan  ASA Score- 2     Anesthesia Type- IV sedation with anesthesia with ASA Monitors  Additional Monitors:   Airway Plan:         Plan Factors-    Induction- intravenous  Postoperative Plan-     Informed Consent- Anesthetic plan and risks discussed with patient  I personally reviewed this patient with the CRNA  Discussed and agreed on the Anesthesia Plan with the CRNA  Aranza Genao Recent labs personally reviewed:  Lab Results   Component Value Date    WBC 13 10 (H) 10/19/2019    HGB 13 0 10/19/2019     10/19/2019     Lab Results   Component Value Date    K 3 7 10/16/2018    BUN 12 10/16/2018    CREATININE 0 78 10/16/2018     Lab Results   Component Value Date    PTT 32 10/19/2019      Lab Results   Component Value Date    INR 1 03 10/19/2019       Blood type A    No results found for: Marylu Solorzano MD, have personally seen and evaluated the patient prior to anesthetic care  I have reviewed the pre-anesthetic record, and other medical records if appropriate to the anesthetic care    If a CRNA is involved in the case, I have reviewed the CRNA assessment, if present, and agree  Risks/benefits and alternatives discussed with patient including possible PONV, sore throat, and possibility of rare anesthetic and surgical emergencies

## 2019-11-13 ENCOUNTER — APPOINTMENT (EMERGENCY)
Dept: RADIOLOGY | Facility: HOSPITAL | Age: 29
End: 2019-11-13
Payer: COMMERCIAL

## 2019-11-13 ENCOUNTER — TELEPHONE (OUTPATIENT)
Dept: GASTROENTEROLOGY | Facility: CLINIC | Age: 29
End: 2019-11-13

## 2019-11-13 ENCOUNTER — HOSPITAL ENCOUNTER (EMERGENCY)
Facility: HOSPITAL | Age: 29
Discharge: HOME | End: 2019-11-13
Attending: EMERGENCY MEDICINE
Payer: COMMERCIAL

## 2019-11-13 VITALS
WEIGHT: 218.48 LBS | HEIGHT: 62 IN | TEMPERATURE: 98.7 F | RESPIRATION RATE: 19 BRPM | DIASTOLIC BLOOD PRESSURE: 60 MMHG | BODY MASS INDEX: 40.2 KG/M2 | OXYGEN SATURATION: 99 % | SYSTOLIC BLOOD PRESSURE: 117 MMHG | HEART RATE: 87 BPM

## 2019-11-13 DIAGNOSIS — K59.09 CHRONIC CONSTIPATION: Primary | ICD-10-CM

## 2019-11-13 LAB
EXT PREG TEST URINE: NEGATIVE
EXT. CONTROL ED NAV: NORMAL

## 2019-11-13 PROCEDURE — 99284 EMERGENCY DEPT VISIT MOD MDM: CPT

## 2019-11-13 PROCEDURE — 99285 EMERGENCY DEPT VISIT HI MDM: CPT | Performed by: PHYSICIAN ASSISTANT

## 2019-11-13 PROCEDURE — 74018 RADEX ABDOMEN 1 VIEW: CPT

## 2019-11-13 PROCEDURE — 81025 URINE PREGNANCY TEST: CPT | Performed by: PHYSICIAN ASSISTANT

## 2019-11-13 RX ORDER — LACTULOSE 20 G/30ML
20 SOLUTION ORAL 2 TIMES DAILY
Qty: 300 ML | Refills: 0 | Status: SHIPPED | OUTPATIENT
Start: 2019-11-13 | End: 2019-11-13 | Stop reason: ALTCHOICE

## 2019-11-13 RX ORDER — MAGNESIUM CARB/ALUMINUM HYDROX 105-160MG
296 TABLET,CHEWABLE ORAL AS NEEDED
Qty: 296 ML | Refills: 0 | Status: SHIPPED | OUTPATIENT
Start: 2019-11-13 | End: 2019-11-25

## 2019-11-13 RX ORDER — DICYCLOMINE HCL 20 MG
20 TABLET ORAL ONCE
Status: COMPLETED | OUTPATIENT
Start: 2019-11-13 | End: 2019-11-13

## 2019-11-13 RX ORDER — DICYCLOMINE HYDROCHLORIDE 10 MG/1
10 CAPSULE ORAL EVERY 6 HOURS PRN
Qty: 20 CAPSULE | Refills: 0 | Status: SHIPPED | OUTPATIENT
Start: 2019-11-13 | End: 2019-11-13 | Stop reason: ALTCHOICE

## 2019-11-13 RX ADMIN — DICYCLOMINE HYDROCHLORIDE 20 MG: 20 TABLET ORAL at 06:32

## 2019-11-13 NOTE — TELEPHONE ENCOUNTER
----- Message from Dustin Fonseca PA-C sent at 11/13/2019  8:49 AM EST -----  This patient is in the ER right now with constipation  They are going to discharge her from the ER  She is on linzess 72 mcg and I told them to have her stop by the office after the ER and please give her samples of the 290 mcg linzess to take  She already has an appt with [facility] on 11/25

## 2019-11-13 NOTE — ED PROVIDER NOTES
History  Chief Complaint   Patient presents with    Constipation     Reports constipation for past month  Has been able to pass small "pebble" sized stools every couple days with last time being yesterday 11/12  Denies abd pain but reports sharp pain in mid buttocks when sitting  Able to pass gas yesterday  History of gluten intolerance  42-year-old female with past medical history significant for chronic constipation and gluten intolerance presents to the emergency department with chief complaint of constipation symptoms and abdominal cramping  Onset of symptoms reported as 1 month ago  Location of symptoms reported as the abdomen  Quality is reported as sharp cramping pain associated with difficulty passing small pebble like hard stool  Severity reported as moderate  Associated symptoms:  Denies vomiting  Denies fevers  Denies rectal bleeding  Denies flank pain  Denies UTI symptoms  Modifying factors:  Patient has been seen outpatient by Gastroenterology, trial on MiraLax without improvement  Trial on Linzess without improvement  Patient underwent EGD yesterday by Gastroenterology but is unaware of results  She is still able to pass gas  She reports a history of food intolerance  Medical summary: review of past visit history via [facility] demonstrates: patient last seen in ed on 10/19/2019 for evaluation of dizziness  History provided by:  Patient   used: No        Prior to Admission Medications   Prescriptions Last Dose Informant Patient Reported?  Taking?   linaCLOtide (LINZESS) 145 MCG CAPS   No No   Sig: Take 1 capsule (145 mcg total) by mouth daily      Facility-Administered Medications: None       Past Medical History:   Diagnosis Date    Constipation     Gluten intolerance     Irregular heart beat        Past Surgical History:   Procedure Laterality Date    CHOLECYSTECTOMY      WISDOM TOOTH EXTRACTION         Family History   Problem Relation Age of Onset    Sickle cell trait Mother     Asthma Father      I have reviewed and agree with the history as documented  Social History     Tobacco Use    Smoking status: Never Smoker    Smokeless tobacco: Never Used   Substance Use Topics    Alcohol use: No    Drug use: No        Review of Systems   Constitutional: Negative for activity change, appetite change, chills, diaphoresis, fatigue, fever and unexpected weight change  HENT: Negative for congestion, dental problem, drooling, ear discharge, ear pain, facial swelling, hearing loss, mouth sores, nosebleeds, postnasal drip, rhinorrhea, sinus pressure, sinus pain, sneezing, sore throat, tinnitus, trouble swallowing and voice change  Eyes: Negative for photophobia, pain, discharge, redness, itching and visual disturbance  Respiratory: Negative for apnea, cough, choking, chest tightness, shortness of breath, wheezing and stridor  Cardiovascular: Negative for chest pain, palpitations and leg swelling  Gastrointestinal: Positive for abdominal pain and constipation  Negative for abdominal distention, anal bleeding, blood in stool, diarrhea, nausea, rectal pain and vomiting  Endocrine: Negative for cold intolerance, heat intolerance, polydipsia, polyphagia and polyuria  Genitourinary: Negative for decreased urine volume, difficulty urinating, dyspareunia, dysuria, enuresis, flank pain, frequency, hematuria, menstrual problem, pelvic pain, urgency, vaginal bleeding, vaginal discharge and vaginal pain  Musculoskeletal: Negative for arthralgias, back pain, gait problem, joint swelling, myalgias, neck pain and neck stiffness  Skin: Negative for color change, pallor, rash and wound  Allergic/Immunologic: Negative for environmental allergies, food allergies and immunocompromised state  Neurological: Negative for dizziness, tremors, seizures, syncope, facial asymmetry, speech difficulty, weakness, light-headedness, numbness and headaches     Hematological: Negative for adenopathy  Does not bruise/bleed easily  Psychiatric/Behavioral: Negative for agitation, confusion, hallucinations, self-injury and suicidal ideas  The patient is nervous/anxious  The patient is not hyperactive  All other systems reviewed and are negative  Physical Exam  Physical Exam   Constitutional: She is oriented to person, place, and time  She appears well-developed and well-nourished  No distress  HENT:   Head: Normocephalic and atraumatic  Right Ear: External ear normal    Left Ear: External ear normal    Nose: Nose normal    Mouth/Throat: Oropharynx is clear and moist  No oropharyngeal exudate  Eyes: Pupils are equal, round, and reactive to light  Conjunctivae and EOM are normal  Right eye exhibits no discharge  Left eye exhibits no discharge  No scleral icterus  Neck: Normal range of motion  Neck supple  No JVD present  No tracheal deviation present  No thyromegaly present  Cardiovascular: Normal rate, regular rhythm and intact distal pulses  Pulmonary/Chest: Effort normal and breath sounds normal  No stridor  No respiratory distress  She has no wheezes  She has no rales  She exhibits no tenderness  Abdominal: Soft  Bowel sounds are normal  She exhibits no distension and no mass  There is no tenderness  There is no rebound and no guarding  No hernia  Musculoskeletal: Normal range of motion  She exhibits no edema, tenderness or deformity  Lymphadenopathy:     She has no cervical adenopathy  Neurological: She is alert and oriented to person, place, and time  She displays normal reflexes  No cranial nerve deficit or sensory deficit  She exhibits normal muscle tone  Coordination normal    Skin: Skin is dry  Capillary refill takes less than 2 seconds  No rash noted  She is not diaphoretic  No erythema  No pallor  Psychiatric: Her speech is normal and behavior is normal  Judgment and thought content normal  Her mood appears anxious   Cognition and memory are normal  Nursing note and vitals reviewed  Vital Signs  ED Triage Vitals   Temperature Pulse Respirations Blood Pressure SpO2   11/13/19 0559 11/13/19 0559 11/13/19 0559 11/13/19 0559 11/13/19 0559   98 7 °F (37 1 °C) 75 18 130/81 97 %      Temp Source Heart Rate Source Patient Position - Orthostatic VS BP Location FiO2 (%)   11/13/19 0559 11/13/19 0848 11/13/19 0559 11/13/19 0559 --   Oral Monitor Sitting Right arm       Pain Score       11/13/19 0559       9           Vitals:    11/13/19 0559 11/13/19 0848   BP: 130/81 117/60   Pulse: 75 87   Patient Position - Orthostatic VS: Sitting Lying         Visual Acuity      ED Medications  Medications   dicyclomine (BENTYL) tablet 20 mg (20 mg Oral Given 11/13/19 7094)       Diagnostic Studies  Results Reviewed     Procedure Component Value Units Date/Time    POCT pregnancy, urine [[id number]]  (Normal) Resulted:  11/13/19 0642    Lab Status:  Final result Updated:  11/13/19 0645     EXT PREG TEST UR (Ref: Negative) negative     Control valid                 XR abdomen 1 view kub   Final Result by Katrina Ramon DO (11/13 0840)      Unremarkable abdomen  Workstation performed: [id number]                    Procedures  Procedures       ED Course                               MDM  Number of Diagnoses or Management Options  Chronic constipation: new and requires workup  Diagnosis management comments: ddx includes but is not limited to constipation, ileus, bowel obstruction, gluten intolerance, colitis, perforated viscus  Plan check kub to evaluate for free air given EGD yesterday  Trial lactulose, bentyl     KUB demonstrates nonobstructive bowel gas pattern and no free air under the diaphragm  I discussed results with patient at bedside  Pregnancy test was reviewed, negative  Patient with history of chronic constipation  Currently following with Gastroenterology  Recently underwent EGD for biopsies to evaluate for possible gluten intolerance  Currently on Linzess for treatment  Patient reports she is still suffering from symptoms  Patient offered mineral oil enema here in ED but decline  Will trial mag  Citrate for further treamtent  Discussed with Gastroenterology, they offered the patient stop by the office to  samples for a higher dose of Linzess any time today before 4:00 p m     I discussed this option with the patient  Discussed follow-up with Gastroenterology for further evaluation of her chronic symptoms  No acute findings to suggest bowel perforation following EGD procedure  Symptoms are similar to prior episodes in the past   There are no new, different or concerning symptoms with patient's current situation  I suspect her symptoms are related to her chronic constipation  Patient voices that she is somewhat frustrated with her persistent symptoms  She is also concerned that she is going to have pain when she does have a bowel movement related to the constipation and this is highly concerning for her  I suspect this is the source of patient's symptoms  Stable for discharge  Reviewed reasons to return to ed  Patient verbalized understanding of diagnosis and agreement with discharge plan of care as well as understanding of reasons to return to ed            Amount and/or Complexity of Data Reviewed  Clinical lab tests: ordered  Tests in the radiology section of CPT®: ordered and reviewed  Discussion of test results with the performing providers: yes  Review and summarize past medical records: yes  Discuss the patient with other providers: yes (GI/Aida)  Independent visualization of images, tracings, or specimens: yes    Patient Progress  Patient progress: stable      Disposition  Final diagnoses:   Chronic constipation     Time reflects when diagnosis was documented in both MDM as applicable and the Disposition within this note     Time User Action Codes Description Comment    11/13/2019  8:30 AM Esther Coto Add [M14 09] Chronic constipation       ED Disposition     ED Disposition Condition Date/Time Comment    Discharge Stable Wed Nov 13, 2019  8:30 AM Stephany Fuss discharge to home/self care  Follow-up Information     Follow up With Specialties Details Why Contact Info Additional Information    Prem Wu MD Internal Medicine Call in 1 day for further evaluation of symptoms [address] Emergency Department Emergency Medicine Go to  If symptoms worsen [address] [facility] Farhad Gibbse [address] ED, [address] [place], [place], [address] [place] [address],  Gastroenterology Call in 1 day for further evaluation of symptoms [address]             Discharge Medication List as of 11/13/2019  8:55 AM      START taking these medications    Details   magnesium citrate (CITROMA) 1 745 g/30 mL oral solution Take 296 mL by mouth as needed (drink 1/2 bottle today  drink remaining 1/2 of bottle in 12 hours if no bowel movement ) for up to 2 doses, Starting Wed 11/13/2019, Print         CONTINUE these medications which have NOT CHANGED    Details   linaCLOtide (LINZESS) 145 MCG CAPS Take 1 capsule (145 mcg total) by mouth daily, Starting Tue 11/12/2019, Normal           No discharge procedures on file      ED Provider  Electronically Signed by           Amy Ingram PA-C  11/13/19 4552

## 2019-11-15 ENCOUNTER — TELEPHONE (OUTPATIENT)
Dept: GASTROENTEROLOGY | Facility: CLINIC | Age: 29
End: 2019-11-15

## 2019-11-15 DIAGNOSIS — K59.04 CHRONIC IDIOPATHIC CONSTIPATION: Primary | ICD-10-CM

## 2019-11-15 NOTE — TELEPHONE ENCOUNTER
Called pt and advised  Pt said OK    rx sent to provider    Please advise the nausea med and the dosage and quanty for  Bentyl  Thank you

## 2019-11-15 NOTE — TELEPHONE ENCOUNTER
Sara pt-  Patient was crying on the phone     She has terrible constipation, nausea and pain     Nothing is working     She declined an appt as, she is 45 min away     Uses: [phone]  Please phone [phone]

## 2019-11-15 NOTE — TELEPHONE ENCOUNTER
Please inform patient that we will start Linzess 290 mcg daily  I will call in nausea medication for her as well if she needs it  I can also start her on Bentyl for abdominal pain    Thank you

## 2019-11-18 LAB — HLA-DQ2 QL: NORMAL

## 2019-11-25 ENCOUNTER — OFFICE VISIT (OUTPATIENT)
Dept: GASTROENTEROLOGY | Facility: CLINIC | Age: 29
End: 2019-11-25
Payer: COMMERCIAL

## 2019-11-25 VITALS
SYSTOLIC BLOOD PRESSURE: 118 MMHG | BODY MASS INDEX: 40.48 KG/M2 | WEIGHT: 220 LBS | DIASTOLIC BLOOD PRESSURE: 66 MMHG | HEART RATE: 77 BPM | HEIGHT: 62 IN

## 2019-11-25 DIAGNOSIS — K90.41 GLUTEN INTOLERANCE: Primary | ICD-10-CM

## 2019-11-25 PROCEDURE — 99214 OFFICE O/P EST MOD 30 MIN: CPT | Performed by: PHYSICIAN ASSISTANT

## 2019-11-25 NOTE — PATIENT INSTRUCTIONS
Constipation   WHAT YOU NEED TO KNOW:   Constipation is when you have hard, dry bowel movements, or you go longer than usual between bowel movements  DISCHARGE INSTRUCTIONS:   Return to the emergency department if:   · You have blood in your bowel movements  · You have a fever and abdominal pain with the constipation  Contact your healthcare provider if:   · Your constipation gets worse  · You start to vomit  · You have questions or concerns about your condition or care  Medicines:   · Medicine or a fiber supplement  may help make your bowel movement softer  A laxative may help relax and loosen your intestines to help you have a bowel movement  You may also be given medicine to increase fluid in your intestines  The fluid may help move bowel movements through your intestines  · Take your medicine as directed  Contact your healthcare provider if you think your medicine is not helping or if you have side effects  Tell him of her if you are allergic to any medicine  Keep a list of the medicines, vitamins, and herbs you take  Include the amounts, and when and why you take them  Bring the list or the pill bottles to follow-up visits  Carry your medicine list with you in case of an emergency  Manage your constipation:   · Drink liquids as directed  You may need to drink extra liquids to help soften and move your bowels  Ask how much liquid to drink each day and which liquids are best for you  · Eat high-fiber foods  This may help decrease constipation by adding bulk to your bowel movements  High-fiber foods include fruit, vegetables, whole-grain breads and cereals, and beans  Your healthcare provider or dietitian can help you create a high-fiber meal plan  · Exercise regularly  Regular physical activity can help stimulate your intestines  Ask which exercises are best for you  · Schedule a time each day to have a bowel movement    This may help train your body to have regular bowel movements  Bend forward while you are on the toilet to help move the bowel movement out  Sit on the toilet for at least 10 minutes, even if you do not have a bowel movement  Follow up with your healthcare provider as directed:  Write down your questions so you remember to ask them during your visits  © 2017 [address] Information is for End User's use only and may not be sold, redistributed or otherwise used for commercial purposes  All illustrations and images included in CareNotes® are the copyrighted property of [employer]  or Jose Phelps  The above information is an  only  It is not intended as medical advice for individual conditions or treatments  Talk to your doctor, nurse or pharmacist before following any medical regimen to see if it is safe and effective for you

## 2019-11-25 NOTE — LETTER
November 25, 2019     Paxton Falk MD  [address] [facility] [address]    Patient: Mekhi Ling   YOB: 1990   Date of Visit: 11/25/2019       Dear Dr Steffen Jacques Recipients: Thank you for referring Mekhi Ling to me for evaluation  Below are my notes for this consultation  If you have questions, please do not hesitate to call me  I look forward to following your patient along with you  Sincerely,        Tyler Covarrubias PA-C        CC: No Recipients  Puja Leigh  11/25/2019  2:05 PM  Sign at close encounter  [facility] - Outpatient Follow-up Note  Mekhi Ling 30 y o  female MRN: [id number]  Encounter: [id number]          ASSESSMENT AND PLAN:    1  Gluten intolerance  Encourage patient to continue to avoid gluten  Patient is symptomatically better on a gluten free diet  Patient will call the office with return of any gastrointestinal symptoms  Will have patient follow up in 6 months     ______________________________________________________________________    SUBJECTIVE:    22-year-old female who is here for follow-up after endoscopy  Patient's EGD from October 30, 2019 was reviewed  Pathology of the duodenum revealed no blunting of intestinal villi are increased number of intraepithelial lymphocytes to suggest presence of gluten sensitive enteropathy  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  Patient does report that since her procedure she has cut out gluten from her diet and she is feeling significantly better  She reports that she stop taking Linzess and his just been eating more fruits vegetables and drinking plenty of water and she is having a bowel movement every day she reports that her energy is better  She reports that the skin rash she had a significantly improved  REVIEW OF SYSTEMS IS OTHERWISE NEGATIVE        Historical Information   Past Medical History:   Diagnosis Date    Constipation     Gluten intolerance     Irregular heart beat      Past Surgical History:   Procedure Laterality Date    CHOLECYSTECTOMY      WISDOM TOOTH EXTRACTION       Social History   Social History     Substance and Sexual Activity   Alcohol Use No     Social History     Substance and Sexual Activity   Drug Use No     Social History     Tobacco Use   Smoking Status Never Smoker   Smokeless Tobacco Never Used     Family History   Problem Relation Age of Onset    Sickle cell trait Mother     Asthma Father        Meds/Allergies     No current outpatient medications on file  Allergies   Allergen Reactions    Shellfish-Derived Products Other (See Comments)     THROAT NUMBNESS      Gluten Meal            Objective     Blood pressure 118/66, pulse 77, height 5' 2" (1 575 m), weight 99 8 kg (220 lb), last menstrual period 11/12/2019, not currently breastfeeding  Body mass index is 40 24 kg/m²  PHYSICAL EXAM:      General Appearance:   Alert, cooperative, no distress   HEENT:   Normocephalic, atraumatic, anicteric      Neck:  Supple, symmetrical, trachea midline   Lungs:   Clear to auscultation bilaterally; no rales, rhonchi or wheezing; respirations unlabored    Heart[de-identified]   Regular rate and rhythm; no murmur, rub, or gallop  Abdomen:   Soft, non-tender, non-distended; normal bowel sounds; no masses, no organomegaly    Genitalia:   Deferred    Rectal:   Deferred    Extremities:  No cyanosis, clubbing or edema    Pulses:  2+ and symmetric    Skin:  No jaundice, rashes, or lesions    Lymph nodes:  No palpable cervical lymphadenopathy        Lab Results:   No visits with results within 1 Day(s) from this visit  Latest known visit with results is:   Admission on 11/13/2019, Discharged on 11/13/2019   Component Date Value    EXT PREG TEST UR (Ref: N* 11/13/2019 negative     Control 11/13/2019 valid          Radiology Results:   Xr Abdomen 1 View Kub    Result Date: 11/13/2019  Narrative: ABDOMEN INDICATION:   post EGD  COMPARISON:  None VIEWS:  AP supine FINDINGS: There is a nonobstructive bowel gas pattern  No discernible free air on this supine study  Upright or left lateral decubitus imaging is more sensitive to detect subtle free air in the appropriate setting  No pathologic calcifications or soft tissue masses  Visualized lung bases are clear  Visualized osseous structures are unremarkable for the patient's age  Impression: Unremarkable abdomen   Workstation performed: [id number]

## 2019-12-20 ENCOUNTER — TELEPHONE (OUTPATIENT)
Dept: INTERNAL MEDICINE CLINIC | Facility: CLINIC | Age: 29
End: 2019-12-20